# Patient Record
Sex: MALE | Race: WHITE
[De-identification: names, ages, dates, MRNs, and addresses within clinical notes are randomized per-mention and may not be internally consistent; named-entity substitution may affect disease eponyms.]

---

## 2018-10-15 ENCOUNTER — HOSPITAL ENCOUNTER (INPATIENT)
Dept: HOSPITAL 54 - ER | Age: 58
LOS: 6 days | Discharge: NURSING, REHAB OR LONG-TERM CARE | DRG: 720 | End: 2018-10-21
Attending: NURSE PRACTITIONER | Admitting: INTERNAL MEDICINE
Payer: MEDICAID

## 2018-10-15 VITALS — BODY MASS INDEX: 27.77 KG/M2 | HEIGHT: 70 IN | WEIGHT: 194 LBS

## 2018-10-15 VITALS — DIASTOLIC BLOOD PRESSURE: 69 MMHG | SYSTOLIC BLOOD PRESSURE: 158 MMHG

## 2018-10-15 VITALS — DIASTOLIC BLOOD PRESSURE: 55 MMHG | SYSTOLIC BLOOD PRESSURE: 108 MMHG

## 2018-10-15 DIAGNOSIS — R65.20: ICD-10-CM

## 2018-10-15 DIAGNOSIS — E87.2: ICD-10-CM

## 2018-10-15 DIAGNOSIS — S21.201A: ICD-10-CM

## 2018-10-15 DIAGNOSIS — Z59.0: ICD-10-CM

## 2018-10-15 DIAGNOSIS — E83.42: ICD-10-CM

## 2018-10-15 DIAGNOSIS — I21.A1: ICD-10-CM

## 2018-10-15 DIAGNOSIS — D53.9: ICD-10-CM

## 2018-10-15 DIAGNOSIS — E87.6: ICD-10-CM

## 2018-10-15 DIAGNOSIS — R60.9: ICD-10-CM

## 2018-10-15 DIAGNOSIS — I11.0: ICD-10-CM
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
## 2018-10-15 DIAGNOSIS — E88.09: ICD-10-CM

## 2018-10-15 DIAGNOSIS — F10.231: ICD-10-CM

## 2018-10-15 DIAGNOSIS — E66.9: ICD-10-CM

## 2018-10-15 DIAGNOSIS — B35.1: ICD-10-CM

## 2018-10-15 DIAGNOSIS — E87.1: ICD-10-CM

## 2018-10-15 DIAGNOSIS — R73.03: ICD-10-CM

## 2018-10-15 DIAGNOSIS — F17.210: ICD-10-CM

## 2018-10-15 DIAGNOSIS — E83.51: ICD-10-CM

## 2018-10-15 DIAGNOSIS — L03.116: ICD-10-CM

## 2018-10-15 DIAGNOSIS — N17.0: ICD-10-CM

## 2018-10-15 DIAGNOSIS — A41.9: Primary | ICD-10-CM

## 2018-10-15 DIAGNOSIS — E80.6: ICD-10-CM

## 2018-10-15 DIAGNOSIS — I50.33: ICD-10-CM

## 2018-10-15 DIAGNOSIS — L03.115: ICD-10-CM

## 2018-10-15 LAB
ALBUMIN SERPL BCP-MCNC: 3 G/DL (ref 3.4–5)
ALP SERPL-CCNC: 174 U/L (ref 46–116)
ALT SERPL W P-5'-P-CCNC: 41 U/L (ref 12–78)
APTT PPP: 25 SEC (ref 23–34)
AST SERPL W P-5'-P-CCNC: 107 U/L (ref 15–37)
BASOPHILS # BLD AUTO: 0.1 /CMM (ref 0–0.2)
BASOPHILS NFR BLD AUTO: 1 % (ref 0–2)
BILIRUB DIRECT SERPL-MCNC: 0.2 MG/DL (ref 0–0.2)
BILIRUB SERPL-MCNC: 0.5 MG/DL (ref 0.2–1)
BUN SERPL-MCNC: 10 MG/DL (ref 7–18)
CALCIUM SERPL-MCNC: 8.5 MG/DL (ref 8.5–10.1)
CHLORIDE SERPL-SCNC: 101 MMOL/L (ref 98–107)
CO2 SERPL-SCNC: 25 MMOL/L (ref 21–32)
CREAT SERPL-MCNC: 0.9 MG/DL (ref 0.6–1.3)
EOSINOPHIL NFR BLD AUTO: 0.6 % (ref 0–6)
GLUCOSE SERPL-MCNC: 118 MG/DL (ref 74–106)
HCT VFR BLD AUTO: 40 % (ref 39–51)
HGB BLD-MCNC: 13.9 G/DL (ref 13.5–17.5)
INR PPP: 1.06 (ref 0.85–1.15)
LYMPHOCYTES NFR BLD AUTO: 0.5 /CMM (ref 0.8–4.8)
LYMPHOCYTES NFR BLD AUTO: 5 % (ref 20–44)
MCHC RBC AUTO-ENTMCNC: 35 G/DL (ref 31–36)
MCV RBC AUTO: 93 FL (ref 80–96)
MONOCYTES NFR BLD AUTO: 0.8 /CMM (ref 0.1–1.3)
MONOCYTES NFR BLD AUTO: 8.5 % (ref 2–12)
NEUTROPHILS # BLD AUTO: 7.8 /CMM (ref 1.8–8.9)
NEUTROPHILS NFR BLD AUTO: 84.9 % (ref 43–81)
PH UR STRIP: 5 [PH] (ref 5–8)
PLATELET # BLD AUTO: 348 /CMM (ref 150–450)
POTASSIUM SERPL-SCNC: 4.6 MMOL/L (ref 3.5–5.1)
PROT SERPL-MCNC: 7.4 G/DL (ref 6.4–8.2)
RBC # BLD AUTO: 4.29 MIL/UL (ref 4.5–6)
RBC #/AREA URNS HPF: (no result) /HPF (ref 0–2)
RDW COEFFICIENT OF VARIATION: 15.2 (ref 11.5–15)
SODIUM SERPL-SCNC: 139 MMOL/L (ref 136–145)
TROPONIN I SERPL-MCNC: < 0.017 NG/ML (ref 0–0.06)
UROBILINOGEN UR STRIP-MCNC: 0.2 EU/DL
WBC #/AREA URNS HPF: (no result) /HPF (ref 0–3)
WBC NRBC COR # BLD AUTO: 9.3 K/UL (ref 4.3–11)

## 2018-10-15 PROCEDURE — G0378 HOSPITAL OBSERVATION PER HR: HCPCS

## 2018-10-15 PROCEDURE — A4606 OXYGEN PROBE USED W OXIMETER: HCPCS

## 2018-10-15 PROCEDURE — A6403 STERILE GAUZE>16 <= 48 SQ IN: HCPCS

## 2018-10-15 PROCEDURE — A6248 HYDROGEL DRSG GEL FILLER: HCPCS

## 2018-10-15 PROCEDURE — Z7610: HCPCS

## 2018-10-15 PROCEDURE — A4216 STERILE WATER/SALINE, 10 ML: HCPCS

## 2018-10-15 PROCEDURE — A6253 ABSORPT DRG > 48 SQ IN W/O B: HCPCS

## 2018-10-15 PROCEDURE — A6402 STERILE GAUZE <= 16 SQ IN: HCPCS

## 2018-10-15 RX ADMIN — PIPERACILLIN SODIUM AND TAZOBACTAM SODIUM SCH MLS/HR: .375; 3 INJECTION, POWDER, LYOPHILIZED, FOR SOLUTION INTRAVENOUS at 23:49

## 2018-10-15 RX ADMIN — HYDROCODONE BITARTRATE AND ACETAMINOPHEN PRN EA: 10; 325 TABLET ORAL at 23:48

## 2018-10-15 RX ADMIN — LORAZEPAM PRN MG: 2 INJECTION INTRAMUSCULAR; INTRAVENOUS at 23:48

## 2018-10-15 RX ADMIN — ENOXAPARIN SODIUM SCH MG: 40 INJECTION SUBCUTANEOUS at 23:50

## 2018-10-15 SDOH — ECONOMIC STABILITY - HOUSING INSECURITY: HOMELESSNESS: Z59.0

## 2018-10-15 NOTE — NUR
PT  FROM A BUS STOP TO ER BED 7. PT IS C/O BILAT FOOT PAIN, SWELLING FOR 
THE PAST 3 MONTHS, STS WAS SEEN AT 3 DIFFERENT HOSPITAL FOR SAME REASON BUT 
ONLY GETS DISCHARGE. PT IS TACHYCARDIC PTA. DENIES CHEST PAIN. AFEBRILE. 
AWAITING MD MARTINEZ.

## 2018-10-15 NOTE — NUR
RN

MD TUCKER AT BEDSIDE, MD AWARE OF GENERALIZED BODY RASH, MD OK TO CONTINUE IV ATB AS ORDERED, 
MD AWARE OF VS, HR 'S, STAT EKG ORDERED, NO EKG FROM ER AVAILABLE, MD WITH NEW ORDER 
TO START ATIVAN 1MG IVP Q 4HRS PRN . PT DENIES ANY ALLERGIES .

## 2018-10-15 NOTE — NUR
PT IS ASSIGNED TO [facility]#: [id number], DX: SEPTIC SHOCK , AND ACCEPTING MD: DR CEBALLOS

## 2018-10-16 VITALS — DIASTOLIC BLOOD PRESSURE: 78 MMHG | SYSTOLIC BLOOD PRESSURE: 134 MMHG

## 2018-10-16 VITALS — DIASTOLIC BLOOD PRESSURE: 68 MMHG | SYSTOLIC BLOOD PRESSURE: 127 MMHG

## 2018-10-16 VITALS — DIASTOLIC BLOOD PRESSURE: 90 MMHG | SYSTOLIC BLOOD PRESSURE: 142 MMHG

## 2018-10-16 VITALS — DIASTOLIC BLOOD PRESSURE: 82 MMHG | SYSTOLIC BLOOD PRESSURE: 141 MMHG

## 2018-10-16 VITALS — SYSTOLIC BLOOD PRESSURE: 121 MMHG | DIASTOLIC BLOOD PRESSURE: 74 MMHG

## 2018-10-16 VITALS — SYSTOLIC BLOOD PRESSURE: 108 MMHG | DIASTOLIC BLOOD PRESSURE: 55 MMHG

## 2018-10-16 LAB
BASOPHILS # BLD AUTO: 0.1 /CMM (ref 0–0.2)
BASOPHILS NFR BLD AUTO: 0.9 % (ref 0–2)
BUN SERPL-MCNC: 6 MG/DL (ref 7–18)
CALCIUM SERPL-MCNC: 7.7 MG/DL (ref 8.5–10.1)
CHLORIDE SERPL-SCNC: 104 MMOL/L (ref 98–107)
CHOLEST SERPL-MCNC: 127 MG/DL (ref ?–200)
CO2 SERPL-SCNC: 25 MMOL/L (ref 21–32)
CREAT SERPL-MCNC: 0.6 MG/DL (ref 0.6–1.3)
EOSINOPHIL NFR BLD AUTO: 0.8 % (ref 0–6)
GLUCOSE SERPL-MCNC: 136 MG/DL (ref 74–106)
HCT VFR BLD AUTO: 35 % (ref 39–51)
HDLC SERPL-MCNC: 78 MG/DL (ref 40–60)
HGB BLD-MCNC: 11.5 G/DL (ref 13.5–17.5)
LDLC SERPL DIRECT ASSAY-MCNC: 41 MG/DL (ref 0–99)
LYMPHOCYTES NFR BLD AUTO: 0.6 /CMM (ref 0.8–4.8)
LYMPHOCYTES NFR BLD AUTO: 6.3 % (ref 20–44)
MAGNESIUM SERPL-MCNC: 1.5 MG/DL (ref 1.8–2.4)
MCHC RBC AUTO-ENTMCNC: 33 G/DL (ref 31–36)
MCV RBC AUTO: 96 FL (ref 80–96)
MONOCYTES NFR BLD AUTO: 1.3 /CMM (ref 0.1–1.3)
MONOCYTES NFR BLD AUTO: 13.8 % (ref 2–12)
NEUTROPHILS # BLD AUTO: 7.2 /CMM (ref 1.8–8.9)
NEUTROPHILS NFR BLD AUTO: 78.2 % (ref 43–81)
PHOSPHATE SERPL-MCNC: 2.7 MG/DL (ref 2.5–4.9)
PLATELET # BLD AUTO: 256 /CMM (ref 150–450)
POTASSIUM SERPL-SCNC: 3.9 MMOL/L (ref 3.5–5.1)
RBC # BLD AUTO: 3.61 MIL/UL (ref 4.5–6)
RDW COEFFICIENT OF VARIATION: 16.3 (ref 11.5–15)
SODIUM SERPL-SCNC: 140 MMOL/L (ref 136–145)
TRIGL SERPL-MCNC: 19 MG/DL (ref 30–150)
TSH SERPL DL<=0.005 MIU/L-ACNC: 3.44 UIU/ML (ref 0.36–3.74)
WBC NRBC COR # BLD AUTO: 9.2 K/UL (ref 4.3–11)

## 2018-10-16 RX ADMIN — PIPERACILLIN SODIUM AND TAZOBACTAM SODIUM SCH MLS/HR: .375; 3 INJECTION, POWDER, LYOPHILIZED, FOR SOLUTION INTRAVENOUS at 05:17

## 2018-10-16 RX ADMIN — HYDROCODONE BITARTRATE AND ACETAMINOPHEN PRN EA: 10; 325 TABLET ORAL at 06:27

## 2018-10-16 RX ADMIN — DEXTROSE MONOHYDRATE SCH MLS/HR: 50 INJECTION, SOLUTION INTRAVENOUS at 02:25

## 2018-10-16 RX ADMIN — Medication SCH EACH: at 17:15

## 2018-10-16 RX ADMIN — LORAZEPAM PRN MG: 2 INJECTION INTRAMUSCULAR; INTRAVENOUS at 18:11

## 2018-10-16 RX ADMIN — DEXTROSE MONOHYDRATE SCH MLS/HR: 50 INJECTION, SOLUTION INTRAVENOUS at 14:08

## 2018-10-16 RX ADMIN — PIPERACILLIN SODIUM AND TAZOBACTAM SODIUM SCH MLS/HR: .375; 3 INJECTION, POWDER, LYOPHILIZED, FOR SOLUTION INTRAVENOUS at 12:06

## 2018-10-16 RX ADMIN — DEXTROSE MONOHYDRATE SCH MLS/HR: 50 INJECTION, SOLUTION INTRAVENOUS at 05:17

## 2018-10-16 RX ADMIN — ENOXAPARIN SODIUM SCH MG: 40 INJECTION SUBCUTANEOUS at 21:33

## 2018-10-16 RX ADMIN — LORAZEPAM PRN MG: 2 INJECTION INTRAMUSCULAR; INTRAVENOUS at 06:27

## 2018-10-16 RX ADMIN — Medication SCH GM: at 11:56

## 2018-10-16 RX ADMIN — CLOTRIMAZOLE SCH GM: 1 CREAM TOPICAL at 17:15

## 2018-10-16 RX ADMIN — MAGNESIUM SULFATE IN DEXTROSE SCH MLS/HR: 10 INJECTION, SOLUTION INTRAVENOUS at 10:50

## 2018-10-16 RX ADMIN — HYDROCODONE BITARTRATE AND ACETAMINOPHEN PRN EA: 10; 325 TABLET ORAL at 11:18

## 2018-10-16 RX ADMIN — PIPERACILLIN SODIUM AND TAZOBACTAM SODIUM SCH MLS/HR: .375; 3 INJECTION, POWDER, LYOPHILIZED, FOR SOLUTION INTRAVENOUS at 23:39

## 2018-10-16 RX ADMIN — PIPERACILLIN SODIUM AND TAZOBACTAM SODIUM SCH MLS/HR: .375; 3 INJECTION, POWDER, LYOPHILIZED, FOR SOLUTION INTRAVENOUS at 18:17

## 2018-10-16 RX ADMIN — MAGNESIUM SULFATE IN DEXTROSE SCH MLS/HR: 10 INJECTION, SOLUTION INTRAVENOUS at 14:01

## 2018-10-16 RX ADMIN — DEXTROSE MONOHYDRATE SCH MLS/HR: 50 INJECTION, SOLUTION INTRAVENOUS at 21:32

## 2018-10-16 NOTE — NUR
WOUND CARE CONSULT: PT PRESENTS WITH MULTIPLE SKIN ISSUES AND WOUNDS INCLUDING REDNESS TO 
ABDOMEN AND BILATERAL THIGHS, REDNESS WITH MULTIPLE OPEN AREAS TO LOWER LEGS, RT HEEL DRY 
WOUND AND RT BACK WOUND, ALL PRESENT ON ADMISSION. RECOMMEND SURGICAL AND DPM CONSULTS. ALL 
SKIN PROTECTION AND WOUND CARE RECOMMENDATIONS DISCUSSED WITH NURSING STAFF. WILL SEE PRN. 
[facility] MD IN AGREEMENT WITH PLAN OF CARE. CURRENT ONIEL 
SCORE IS 14. 

-------------------------------------------------------------------------------

Addendum: 10/16/18 at 1030 by ALANIS DICKERSON WNDNU

-------------------------------------------------------------------------------

Amended: Links added.
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
## 2018-10-16 NOTE — NUR
SONU/RN NOTES:



RECEIVED PT. IN BED W/ HOB ELEVATED AT ALL TIMES. ON TELE MONITOR W/ ST W/ 122. W/ D5NS @ 
100 ML/HR VIA RIGHT HAND W/ PATENT AND INTACT W/ NO S/S OF INFECTION/INFILTRATION NOTED. W/ 
LFA G 18 PATENT AND INTACT W/ NO S/S OF INFECTION/INFILTRATION NOTED. W/ DRESSING INTACT TO 
BILATERAL LOWER EXTREMITIES. CONTINENT OF B/B. ABLE TO USE URINAL. CALL LIGHT W/ REACH. WILL 
CONTINUE TO MONITOR.

## 2018-10-16 NOTE — NUR
RN NBOTES



PT IN STABLE CONDITION. NO ACUTE CHANGES NOTED THROUGHOUT SHIFT. ALL NEEDS ANTICIPATED. 
ENDORSED TO PM SHIFT RN FOR SAUMYA

## 2018-10-16 NOTE — NUR
RN NOTES



PT C/O ITCHING AFTER VANCO STARTED. HELD IV ATB. NO SOB NOTED. NO C/O PAIN. NOTIFIED NP TEJAL FORREST RE: REACTION. PER NP, OK TO CONTINUE VANCO, JUST RUN IT FOR 2HRS.

## 2018-10-16 NOTE — NUR
SONU/RN INITIAL NOTES



RECEIVED PT IN BED. A/O X4 ON ROOM AIR. NO SOB NOTED. SINUS TACHY ON TELEMONITOR -120. 
WITH ONGOING IVF D5NS @ 100ML/HR INFUSING WELL ON RHAND G18. LFA G18 SL INTACT AND PATENT. 
HOB ELEVATED. SAFETY MEASURES IN PLACED. CALL LIGHT WITHIN REACH. WILL CONT TO MONITOR

## 2018-10-16 NOTE — NUR
RN NOTES



CNA REPORTED SPO2 =90%, NO SOB NOTED. PLACED ON 2L O2 VIA NC, PT TOLERATING WELL. SPO2 WENT 
UP TO 95%

## 2018-10-17 VITALS — SYSTOLIC BLOOD PRESSURE: 134 MMHG | DIASTOLIC BLOOD PRESSURE: 91 MMHG

## 2018-10-17 VITALS — DIASTOLIC BLOOD PRESSURE: 84 MMHG | SYSTOLIC BLOOD PRESSURE: 138 MMHG

## 2018-10-17 VITALS — DIASTOLIC BLOOD PRESSURE: 87 MMHG | SYSTOLIC BLOOD PRESSURE: 145 MMHG

## 2018-10-17 VITALS — DIASTOLIC BLOOD PRESSURE: 95 MMHG | SYSTOLIC BLOOD PRESSURE: 147 MMHG

## 2018-10-17 VITALS — SYSTOLIC BLOOD PRESSURE: 143 MMHG | DIASTOLIC BLOOD PRESSURE: 96 MMHG

## 2018-10-17 VITALS — SYSTOLIC BLOOD PRESSURE: 154 MMHG | DIASTOLIC BLOOD PRESSURE: 109 MMHG

## 2018-10-17 LAB
ALBUMIN SERPL BCP-MCNC: 2.1 G/DL (ref 3.4–5)
ALBUMIN SERPL BCP-MCNC: 2.3 G/DL (ref 3.4–5)
ALP SERPL-CCNC: 117 U/L (ref 46–116)
ALP SERPL-CCNC: 127 U/L (ref 46–116)
ALT SERPL W P-5'-P-CCNC: 24 U/L (ref 12–78)
ALT SERPL W P-5'-P-CCNC: 29 U/L (ref 12–78)
AST SERPL W P-5'-P-CCNC: 65 U/L (ref 15–37)
AST SERPL W P-5'-P-CCNC: 73 U/L (ref 15–37)
BASOPHILS # BLD AUTO: 0 /CMM (ref 0–0.2)
BASOPHILS # BLD AUTO: 0.1 /CMM (ref 0–0.2)
BASOPHILS NFR BLD AUTO: 0.2 % (ref 0–2)
BASOPHILS NFR BLD AUTO: 0.7 % (ref 0–2)
BILIRUB SERPL-MCNC: 1 MG/DL (ref 0.2–1)
BILIRUB SERPL-MCNC: 1.1 MG/DL (ref 0.2–1)
BUN SERPL-MCNC: 6 MG/DL (ref 7–18)
BUN SERPL-MCNC: 8 MG/DL (ref 7–18)
CALCIUM SERPL-MCNC: 7.9 MG/DL (ref 8.5–10.1)
CALCIUM SERPL-MCNC: 8.2 MG/DL (ref 8.5–10.1)
CHLORIDE SERPL-SCNC: 100 MMOL/L (ref 98–107)
CHLORIDE SERPL-SCNC: 99 MMOL/L (ref 98–107)
CO2 SERPL-SCNC: 27 MMOL/L (ref 21–32)
CO2 SERPL-SCNC: 28 MMOL/L (ref 21–32)
CREAT SERPL-MCNC: 0.7 MG/DL (ref 0.6–1.3)
CREAT SERPL-MCNC: 0.9 MG/DL (ref 0.6–1.3)
EOSINOPHIL NFR BLD AUTO: 1.2 % (ref 0–6)
EOSINOPHIL NFR BLD AUTO: 3.5 % (ref 0–6)
GLUCOSE SERPL-MCNC: 117 MG/DL (ref 74–106)
GLUCOSE SERPL-MCNC: 147 MG/DL (ref 74–106)
HCT VFR BLD AUTO: 36 % (ref 39–51)
HCT VFR BLD AUTO: 37 % (ref 39–51)
HGB BLD-MCNC: 11.7 G/DL (ref 13.5–17.5)
HGB BLD-MCNC: 11.9 G/DL (ref 13.5–17.5)
LIPASE SERPL-CCNC: 208 U/L (ref 73–393)
LYMPHOCYTES NFR BLD AUTO: 0.8 /CMM (ref 0.8–4.8)
LYMPHOCYTES NFR BLD AUTO: 0.9 /CMM (ref 0.8–4.8)
LYMPHOCYTES NFR BLD AUTO: 6.2 % (ref 20–44)
LYMPHOCYTES NFR BLD AUTO: 7.1 % (ref 20–44)
MAGNESIUM SERPL-MCNC: 1.8 MG/DL (ref 1.8–2.4)
MCHC RBC AUTO-ENTMCNC: 32 G/DL (ref 31–36)
MCHC RBC AUTO-ENTMCNC: 33 G/DL (ref 31–36)
MCV RBC AUTO: 97 FL (ref 80–96)
MCV RBC AUTO: 97 FL (ref 80–96)
MONOCYTES NFR BLD AUTO: 1.1 /CMM (ref 0.1–1.3)
MONOCYTES NFR BLD AUTO: 1.2 /CMM (ref 0.1–1.3)
MONOCYTES NFR BLD AUTO: 8.9 % (ref 2–12)
MONOCYTES NFR BLD AUTO: 9.7 % (ref 2–12)
NEUTROPHILS # BLD AUTO: 10.3 /CMM (ref 1.8–8.9)
NEUTROPHILS # BLD AUTO: 9.5 /CMM (ref 1.8–8.9)
NEUTROPHILS NFR BLD AUTO: 79 % (ref 43–81)
NEUTROPHILS NFR BLD AUTO: 83.5 % (ref 43–81)
PHOSPHATE SERPL-MCNC: 1.8 MG/DL (ref 2.5–4.9)
PLATELET # BLD AUTO: 189 /CMM (ref 150–450)
PLATELET # BLD AUTO: 231 /CMM (ref 150–450)
POTASSIUM SERPL-SCNC: 3 MMOL/L (ref 3.5–5.1)
POTASSIUM SERPL-SCNC: 3.4 MMOL/L (ref 3.5–5.1)
PROT SERPL-MCNC: 5.8 G/DL (ref 6.4–8.2)
PROT SERPL-MCNC: 6.2 G/DL (ref 6.4–8.2)
RBC # BLD AUTO: 3.69 MIL/UL (ref 4.5–6)
RBC # BLD AUTO: 3.84 MIL/UL (ref 4.5–6)
RDW COEFFICIENT OF VARIATION: 16.2 (ref 11.5–15)
RDW COEFFICIENT OF VARIATION: 16.4 (ref 11.5–15)
SODIUM SERPL-SCNC: 128 MMOL/L (ref 136–145)
SODIUM SERPL-SCNC: 137 MMOL/L (ref 136–145)
WBC NRBC COR # BLD AUTO: 12 K/UL (ref 4.3–11)
WBC NRBC COR # BLD AUTO: 12.3 K/UL (ref 4.3–11)

## 2018-10-17 RX ADMIN — ENOXAPARIN SODIUM SCH MG: 40 INJECTION SUBCUTANEOUS at 20:52

## 2018-10-17 RX ADMIN — PIPERACILLIN SODIUM AND TAZOBACTAM SODIUM SCH MLS/HR: .375; 3 INJECTION, POWDER, LYOPHILIZED, FOR SOLUTION INTRAVENOUS at 12:09

## 2018-10-17 RX ADMIN — Medication PRN MG: at 21:35

## 2018-10-17 RX ADMIN — PIPERACILLIN SODIUM AND TAZOBACTAM SODIUM SCH MLS/HR: .375; 3 INJECTION, POWDER, LYOPHILIZED, FOR SOLUTION INTRAVENOUS at 17:23

## 2018-10-17 RX ADMIN — DEXTROSE MONOHYDRATE SCH MLS/HR: 50 INJECTION, SOLUTION INTRAVENOUS at 00:43

## 2018-10-17 RX ADMIN — Medication SCH EACH: at 17:23

## 2018-10-17 RX ADMIN — POTASSIUM CHLORIDE SCH MEQ: 1500 TABLET, EXTENDED RELEASE ORAL at 13:24

## 2018-10-17 RX ADMIN — DEXTROSE MONOHYDRATE SCH MLS/HR: 50 INJECTION, SOLUTION INTRAVENOUS at 20:52

## 2018-10-17 RX ADMIN — POTASSIUM CHLORIDE SCH MEQ: 1500 TABLET, EXTENDED RELEASE ORAL at 12:03

## 2018-10-17 RX ADMIN — Medication SCH EACH: at 09:04

## 2018-10-17 RX ADMIN — Medication SCH GM: at 10:29

## 2018-10-17 RX ADMIN — CLOTRIMAZOLE SCH GM: 1 CREAM TOPICAL at 10:30

## 2018-10-17 RX ADMIN — DEXTROSE MONOHYDRATE SCH MLS/HR: 50 INJECTION, SOLUTION INTRAVENOUS at 13:24

## 2018-10-17 RX ADMIN — SODIUM CHLORIDE SCH MLS/HR: 9 INJECTION, SOLUTION INTRAVENOUS at 12:22

## 2018-10-17 RX ADMIN — PIPERACILLIN SODIUM AND TAZOBACTAM SODIUM SCH MLS/HR: .375; 3 INJECTION, POWDER, LYOPHILIZED, FOR SOLUTION INTRAVENOUS at 05:49

## 2018-10-17 RX ADMIN — CLOTRIMAZOLE SCH GM: 1 CREAM TOPICAL at 17:23

## 2018-10-17 RX ADMIN — DEXTROSE MONOHYDRATE SCH MLS/HR: 50 INJECTION, SOLUTION INTRAVENOUS at 01:14

## 2018-10-17 RX ADMIN — POTASSIUM CHLORIDE SCH MEQ: 1500 TABLET, EXTENDED RELEASE ORAL at 14:51

## 2018-10-17 RX ADMIN — LORAZEPAM PRN MG: 2 INJECTION INTRAMUSCULAR; INTRAVENOUS at 03:03

## 2018-10-17 RX ADMIN — Medication PRN EACH: at 03:03

## 2018-10-17 RX ADMIN — DEXTROSE MONOHYDRATE SCH MLS/HR: 50 INJECTION, SOLUTION INTRAVENOUS at 04:58

## 2018-10-17 RX ADMIN — LORAZEPAM PRN MG: 2 INJECTION INTRAMUSCULAR; INTRAVENOUS at 16:05

## 2018-10-17 RX ADMIN — HYDROCODONE BITARTRATE AND ACETAMINOPHEN PRN EA: 10; 325 TABLET ORAL at 09:05

## 2018-10-17 RX ADMIN — LORAZEPAM PRN MG: 2 INJECTION INTRAMUSCULAR; INTRAVENOUS at 20:35

## 2018-10-18 VITALS — DIASTOLIC BLOOD PRESSURE: 77 MMHG | SYSTOLIC BLOOD PRESSURE: 127 MMHG

## 2018-10-18 VITALS — SYSTOLIC BLOOD PRESSURE: 119 MMHG | DIASTOLIC BLOOD PRESSURE: 85 MMHG

## 2018-10-18 VITALS — SYSTOLIC BLOOD PRESSURE: 130 MMHG | DIASTOLIC BLOOD PRESSURE: 83 MMHG

## 2018-10-18 VITALS — DIASTOLIC BLOOD PRESSURE: 82 MMHG | SYSTOLIC BLOOD PRESSURE: 116 MMHG

## 2018-10-18 VITALS — SYSTOLIC BLOOD PRESSURE: 158 MMHG | DIASTOLIC BLOOD PRESSURE: 96 MMHG

## 2018-10-18 VITALS — DIASTOLIC BLOOD PRESSURE: 94 MMHG | SYSTOLIC BLOOD PRESSURE: 134 MMHG

## 2018-10-18 VITALS — SYSTOLIC BLOOD PRESSURE: 137 MMHG | DIASTOLIC BLOOD PRESSURE: 82 MMHG

## 2018-10-18 VITALS — DIASTOLIC BLOOD PRESSURE: 76 MMHG | SYSTOLIC BLOOD PRESSURE: 141 MMHG

## 2018-10-18 VITALS — SYSTOLIC BLOOD PRESSURE: 117 MMHG | DIASTOLIC BLOOD PRESSURE: 77 MMHG

## 2018-10-18 VITALS — DIASTOLIC BLOOD PRESSURE: 80 MMHG | SYSTOLIC BLOOD PRESSURE: 124 MMHG

## 2018-10-18 VITALS — DIASTOLIC BLOOD PRESSURE: 78 MMHG | SYSTOLIC BLOOD PRESSURE: 112 MMHG

## 2018-10-18 VITALS — SYSTOLIC BLOOD PRESSURE: 128 MMHG | DIASTOLIC BLOOD PRESSURE: 74 MMHG

## 2018-10-18 VITALS — DIASTOLIC BLOOD PRESSURE: 88 MMHG | SYSTOLIC BLOOD PRESSURE: 131 MMHG

## 2018-10-18 VITALS — DIASTOLIC BLOOD PRESSURE: 90 MMHG | SYSTOLIC BLOOD PRESSURE: 132 MMHG

## 2018-10-18 VITALS — DIASTOLIC BLOOD PRESSURE: 73 MMHG | SYSTOLIC BLOOD PRESSURE: 119 MMHG

## 2018-10-18 VITALS — DIASTOLIC BLOOD PRESSURE: 89 MMHG | SYSTOLIC BLOOD PRESSURE: 131 MMHG

## 2018-10-18 VITALS — SYSTOLIC BLOOD PRESSURE: 133 MMHG | DIASTOLIC BLOOD PRESSURE: 73 MMHG

## 2018-10-18 VITALS — DIASTOLIC BLOOD PRESSURE: 78 MMHG | SYSTOLIC BLOOD PRESSURE: 119 MMHG

## 2018-10-18 VITALS — SYSTOLIC BLOOD PRESSURE: 152 MMHG | DIASTOLIC BLOOD PRESSURE: 84 MMHG

## 2018-10-18 VITALS — SYSTOLIC BLOOD PRESSURE: 126 MMHG | DIASTOLIC BLOOD PRESSURE: 78 MMHG

## 2018-10-18 VITALS — DIASTOLIC BLOOD PRESSURE: 86 MMHG | SYSTOLIC BLOOD PRESSURE: 125 MMHG

## 2018-10-18 VITALS — SYSTOLIC BLOOD PRESSURE: 129 MMHG | DIASTOLIC BLOOD PRESSURE: 80 MMHG

## 2018-10-18 LAB
ALBUMIN SERPL BCP-MCNC: 2.5 G/DL (ref 3.4–5)
ALP SERPL-CCNC: 149 U/L (ref 46–116)
ALT SERPL W P-5'-P-CCNC: 32 U/L (ref 12–78)
AST SERPL W P-5'-P-CCNC: 96 U/L (ref 15–37)
BASOPHILS # BLD AUTO: 0.2 /CMM (ref 0–0.2)
BASOPHILS NFR BLD AUTO: 1.3 % (ref 0–2)
BILIRUB SERPL-MCNC: 1.2 MG/DL (ref 0.2–1)
BUN SERPL-MCNC: 12 MG/DL (ref 7–18)
CALCIUM SERPL-MCNC: 8.5 MG/DL (ref 8.5–10.1)
CHLORIDE SERPL-SCNC: 102 MMOL/L (ref 98–107)
CO2 SERPL-SCNC: 25 MMOL/L (ref 21–32)
CREAT SERPL-MCNC: 1.4 MG/DL (ref 0.6–1.3)
EOSINOPHIL NFR BLD AUTO: 0.3 % (ref 0–6)
GLUCOSE SERPL-MCNC: 77 MG/DL (ref 74–106)
HCT VFR BLD AUTO: 38 % (ref 39–51)
HGB BLD-MCNC: 12.5 G/DL (ref 13.5–17.5)
LYMPHOCYTES NFR BLD AUTO: 0.7 /CMM (ref 0.8–4.8)
LYMPHOCYTES NFR BLD AUTO: 4.8 % (ref 20–44)
MAGNESIUM SERPL-MCNC: 1.8 MG/DL (ref 1.8–2.4)
MCHC RBC AUTO-ENTMCNC: 33 G/DL (ref 31–36)
MCV RBC AUTO: 97 FL (ref 80–96)
MONOCYTES NFR BLD AUTO: 1.9 /CMM (ref 0.1–1.3)
MONOCYTES NFR BLD AUTO: 13.7 % (ref 2–12)
NEUTROPHILS # BLD AUTO: 11.1 /CMM (ref 1.8–8.9)
NEUTROPHILS NFR BLD AUTO: 79.9 % (ref 43–81)
PHOSPHATE SERPL-MCNC: 2.8 MG/DL (ref 2.5–4.9)
PLATELET # BLD AUTO: 236 /CMM (ref 150–450)
POTASSIUM SERPL-SCNC: 4 MMOL/L (ref 3.5–5.1)
PROT SERPL-MCNC: 6.6 G/DL (ref 6.4–8.2)
RBC # BLD AUTO: 3.96 MIL/UL (ref 4.5–6)
RDW COEFFICIENT OF VARIATION: 16.1 (ref 11.5–15)
SODIUM SERPL-SCNC: 141 MMOL/L (ref 136–145)
TROPONIN I SERPL-MCNC: 0.15 NG/ML (ref 0–0.06)
WBC NRBC COR # BLD AUTO: 13.8 K/UL (ref 4.3–11)

## 2018-10-18 RX ADMIN — LORAZEPAM PRN MG: 2 INJECTION INTRAMUSCULAR; INTRAVENOUS at 17:17

## 2018-10-18 RX ADMIN — ALBUTEROL SULFATE SCH MG: 2.5 SOLUTION RESPIRATORY (INHALATION) at 19:35

## 2018-10-18 RX ADMIN — PIPERACILLIN SODIUM AND TAZOBACTAM SODIUM SCH MLS/HR: .375; 3 INJECTION, POWDER, LYOPHILIZED, FOR SOLUTION INTRAVENOUS at 17:17

## 2018-10-18 RX ADMIN — PIPERACILLIN SODIUM AND TAZOBACTAM SODIUM SCH MLS/HR: .375; 3 INJECTION, POWDER, LYOPHILIZED, FOR SOLUTION INTRAVENOUS at 06:04

## 2018-10-18 RX ADMIN — Medication SCH EACH: at 08:27

## 2018-10-18 RX ADMIN — DEXTROSE MONOHYDRATE SCH MLS/HR: 50 INJECTION, SOLUTION INTRAVENOUS at 01:27

## 2018-10-18 RX ADMIN — ENOXAPARIN SODIUM SCH MG: 40 INJECTION SUBCUTANEOUS at 21:12

## 2018-10-18 RX ADMIN — SODIUM CHLORIDE SCH MLS/HR: 9 INJECTION, SOLUTION INTRAVENOUS at 18:00

## 2018-10-18 RX ADMIN — DEXTROSE MONOHYDRATE SCH MLS/HR: 50 INJECTION, SOLUTION INTRAVENOUS at 13:24

## 2018-10-18 RX ADMIN — Medication PRN MG: at 11:28

## 2018-10-18 RX ADMIN — Medication PRN MG: at 03:58

## 2018-10-18 RX ADMIN — CLOTRIMAZOLE SCH APPLIC: 1 CREAM TOPICAL at 17:17

## 2018-10-18 RX ADMIN — DEXTROSE MONOHYDRATE SCH MLS/HR: 50 INJECTION, SOLUTION INTRAVENOUS at 22:00

## 2018-10-18 RX ADMIN — SODIUM CHLORIDE SCH MLS/HR: 9 INJECTION, SOLUTION INTRAVENOUS at 02:12

## 2018-10-18 RX ADMIN — ALBUTEROL SULFATE SCH MG: 2.5 SOLUTION RESPIRATORY (INHALATION) at 15:30

## 2018-10-18 RX ADMIN — ALBUTEROL SULFATE SCH MG: 2.5 SOLUTION RESPIRATORY (INHALATION) at 11:24

## 2018-10-18 RX ADMIN — DEXTROSE MONOHYDRATE SCH MLS/HR: 50 INJECTION, SOLUTION INTRAVENOUS at 01:26

## 2018-10-18 RX ADMIN — LORAZEPAM PRN MG: 2 INJECTION INTRAMUSCULAR; INTRAVENOUS at 14:22

## 2018-10-18 RX ADMIN — PIPERACILLIN SODIUM AND TAZOBACTAM SODIUM SCH MLS/HR: .375; 3 INJECTION, POWDER, LYOPHILIZED, FOR SOLUTION INTRAVENOUS at 01:03

## 2018-10-18 RX ADMIN — PIPERACILLIN SODIUM AND TAZOBACTAM SODIUM SCH MLS/HR: .375; 3 INJECTION, POWDER, LYOPHILIZED, FOR SOLUTION INTRAVENOUS at 11:32

## 2018-10-18 RX ADMIN — LORAZEPAM PRN MG: 2 INJECTION INTRAMUSCULAR; INTRAVENOUS at 13:03

## 2018-10-18 RX ADMIN — CLOTRIMAZOLE SCH APPLIC: 1 CREAM TOPICAL at 08:26

## 2018-10-18 RX ADMIN — LORAZEPAM PRN MG: 2 INJECTION INTRAMUSCULAR; INTRAVENOUS at 09:19

## 2018-10-18 RX ADMIN — ALBUTEROL SULFATE SCH MG: 2.5 SOLUTION RESPIRATORY (INHALATION) at 23:12

## 2018-10-18 RX ADMIN — SODIUM CHLORIDE SCH MLS/HR: 9 INJECTION, SOLUTION INTRAVENOUS at 13:08

## 2018-10-18 RX ADMIN — MEROPENEM SCH MLS/HR: 500 INJECTION INTRAVENOUS at 21:11

## 2018-10-18 RX ADMIN — LORAZEPAM PRN MG: 2 INJECTION INTRAMUSCULAR; INTRAVENOUS at 01:01

## 2018-10-18 RX ADMIN — Medication SCH EACH: at 17:17

## 2018-10-18 RX ADMIN — Medication SCH APPLIC: at 08:26

## 2018-10-18 RX ADMIN — DEXTROSE MONOHYDRATE SCH MLS/HR: 50 INJECTION, SOLUTION INTRAVENOUS at 04:00

## 2018-10-18 NOTE — NUR
TELE RN NOTES

SEEN BY SEBASTIAN KELLER UPDATED ABOUT PATIENT CONDITION WITH LABS.WILL LET  KNOW ABOUT 
PATIENT CONDITION.

## 2018-10-18 NOTE — NUR
icu rn. initial assessment. received the pt rest on the bed. LETHARGIC, PT IS SLEEPING. 
OXYGEN 4L VIA NASAL CANNULA. SAT 98%. NO ACUTE DISTRESS NOTED. CARDIAC MONITOR SHOWING NSR, 
IV RT AND LT HAND 18G. IVF NS 100ML/H, HOB ELEVATED. AFEBRILE.MONICA SOFT WRIST RESTRAINT 
CHECKED AND RELEASED. NO INJURY OR REDNESS NOTED. WILL CONTINUE TO MONITOR VITALS.

## 2018-10-18 NOTE — NUR
TELE RN NOTES

PATIENT TRANSFERRED TO ICU WITH ACLS PROTOCOL.ALL BELONGINGS AND MEDICATION HANDED OVER TO 
THE NURSE MANN.

## 2018-10-18 NOTE — NUR
received pt from Tele, s/p alcohol withdrawal, tremors present, pt alert, follows commands, 
confused at times, SR, ST, on  4 L 02 sat well, Ativan 2mg ivp given in Tele, BLLE 
cellulites and wounds, 2 iv lines started, restraints on,  v/s stable, no pain, pt turned 
and repositioned.

## 2018-10-18 NOTE — NUR
TELE RN NOTES



AWAKE & RESPONSIVE. STILL CONFUSED AT TIMES. NOT IN ANY DISTRESS. NO SOB NOTED. DENIES ANY 
PAIN OR DISCOMFORT AT THIS TIME. ON TELE ST @ 115 WITH IVF INFUSING WELL. AM CARE DONE. 
MONITORED ACCORDINGLY. CALL LIGHT WITHIN REACH. BED IN LOWEST POSITION. SR UP X 3 FOR 
SAFETY. WILL ENDORSE TO NEXT SHIFT.

## 2018-10-18 NOTE — NUR
TELE RN NOTES

 SEEN BY  UPDATED ABOUT PATIENT CONDITION.DIAPHORETIC WITH TREMORS,DENIED CHEST 
PAIN.GOT ORDER FOR STAT EKG AND LORAZEPAM 2MGX1 AND TRANSFER TO ICU.CARRIED OUT ORDERS.CALL 
MADE TO ICU NURSE,REPORT GIVEN TO MACKENZIE.MADE AWARE DRESSING CHANGE DONE AND ABOUT PATIENT 
CONDITION.WILL TRANSFER PATIENT TO ICU.

## 2018-10-18 NOTE — NUR
PT ASLEEP AND IN NO RESP DISTRESS NOTED. WILL CONT TO MONITOR

-------------------------------------------------------------------------------

Addendum: 10/18/18 at 1648 by ALFREDO COPELAND RT

-------------------------------------------------------------------------------

Amended: Links added.

## 2018-10-18 NOTE — NUR
ICU RN. 2200 VANCOMYCIN  NOT GIVEN. VANCO LEVEL IS 34.  I CALLED [facility]ARM[facility].  MADE 
AWARE, SHE SAID  JUST  HOLD TONIGHT AND TOMORROW WILL DO VANCOMYCIN LEVEL.

## 2018-10-18 NOTE — NUR
TELE RN NOTES

PATIENT IS AGITATED PRN ATIVAN GIVEN, WITH TREMORS,DIAPHORETIC ,AXOX4 BUT TALKING TO HIMSELF 
WITH PERIODS OF CONFUSION.VITAL SIGNS STABLE.O2 SAT 96%.WHEEZING PRESENT.NO SOB NO 
DISTRESS NOTED. AWARE.GOT NEW ORDER FOR STAT C XRAY AND BREATHING TREATMENT .WILL 
CONTINUE TO MONITOR.

## 2018-10-18 NOTE — NUR
TELE RN OPENING NOTES:



RECEIVED PT. IN BED W/ HOB ELEVATED . ON TELE MONITOR W/ ST W/ 112. IV LFA WITH NS @ 100 
ML/HR  PATENT AND INTACT W/ NO S/S OF INFECTION/INFILTRATION NOTED.RESTLESS.NO SOB NO 
DISTRESS NOTED.DENIED PAIN.AXOX4 WITH PERIODS OF CONFUSION.TALKING TO HIMSELF.BUE SOFT 
RESTRAINS ON .ABDOMEN DISTENDED.SRX3,CALL LIGHT IN REACH.BED IS LOW AND IN LOCKED 
POSITION.WILL CONTINUE TO MONITOR.

## 2018-10-18 NOTE — NUR
pt is resting in the bed, SR, alert, follows commands, occasional tremors present, ativan 
ivp 2mg given, v/s stable, no pain, pt cleaned, changed and repositioned q2hrs.

## 2018-10-19 VITALS — SYSTOLIC BLOOD PRESSURE: 143 MMHG | DIASTOLIC BLOOD PRESSURE: 98 MMHG

## 2018-10-19 VITALS — DIASTOLIC BLOOD PRESSURE: 85 MMHG | SYSTOLIC BLOOD PRESSURE: 134 MMHG

## 2018-10-19 VITALS — SYSTOLIC BLOOD PRESSURE: 142 MMHG | DIASTOLIC BLOOD PRESSURE: 97 MMHG

## 2018-10-19 VITALS — DIASTOLIC BLOOD PRESSURE: 87 MMHG | SYSTOLIC BLOOD PRESSURE: 153 MMHG

## 2018-10-19 VITALS — DIASTOLIC BLOOD PRESSURE: 92 MMHG | SYSTOLIC BLOOD PRESSURE: 141 MMHG

## 2018-10-19 VITALS — DIASTOLIC BLOOD PRESSURE: 78 MMHG | SYSTOLIC BLOOD PRESSURE: 126 MMHG

## 2018-10-19 VITALS — DIASTOLIC BLOOD PRESSURE: 93 MMHG | SYSTOLIC BLOOD PRESSURE: 156 MMHG

## 2018-10-19 VITALS — SYSTOLIC BLOOD PRESSURE: 133 MMHG | DIASTOLIC BLOOD PRESSURE: 95 MMHG

## 2018-10-19 VITALS — DIASTOLIC BLOOD PRESSURE: 99 MMHG | SYSTOLIC BLOOD PRESSURE: 162 MMHG

## 2018-10-19 VITALS — SYSTOLIC BLOOD PRESSURE: 127 MMHG | DIASTOLIC BLOOD PRESSURE: 80 MMHG

## 2018-10-19 VITALS — SYSTOLIC BLOOD PRESSURE: 148 MMHG | DIASTOLIC BLOOD PRESSURE: 86 MMHG

## 2018-10-19 VITALS — SYSTOLIC BLOOD PRESSURE: 144 MMHG | DIASTOLIC BLOOD PRESSURE: 85 MMHG

## 2018-10-19 VITALS — SYSTOLIC BLOOD PRESSURE: 141 MMHG | DIASTOLIC BLOOD PRESSURE: 109 MMHG

## 2018-10-19 VITALS — DIASTOLIC BLOOD PRESSURE: 86 MMHG | SYSTOLIC BLOOD PRESSURE: 131 MMHG

## 2018-10-19 VITALS — DIASTOLIC BLOOD PRESSURE: 92 MMHG | SYSTOLIC BLOOD PRESSURE: 142 MMHG

## 2018-10-19 VITALS — DIASTOLIC BLOOD PRESSURE: 90 MMHG | SYSTOLIC BLOOD PRESSURE: 138 MMHG

## 2018-10-19 VITALS — SYSTOLIC BLOOD PRESSURE: 113 MMHG | DIASTOLIC BLOOD PRESSURE: 80 MMHG

## 2018-10-19 VITALS — SYSTOLIC BLOOD PRESSURE: 131 MMHG | DIASTOLIC BLOOD PRESSURE: 82 MMHG

## 2018-10-19 VITALS — SYSTOLIC BLOOD PRESSURE: 139 MMHG | DIASTOLIC BLOOD PRESSURE: 76 MMHG

## 2018-10-19 VITALS — DIASTOLIC BLOOD PRESSURE: 93 MMHG | SYSTOLIC BLOOD PRESSURE: 138 MMHG

## 2018-10-19 VITALS — SYSTOLIC BLOOD PRESSURE: 121 MMHG | DIASTOLIC BLOOD PRESSURE: 73 MMHG

## 2018-10-19 VITALS — DIASTOLIC BLOOD PRESSURE: 98 MMHG | SYSTOLIC BLOOD PRESSURE: 142 MMHG

## 2018-10-19 VITALS — SYSTOLIC BLOOD PRESSURE: 136 MMHG | DIASTOLIC BLOOD PRESSURE: 90 MMHG

## 2018-10-19 VITALS — SYSTOLIC BLOOD PRESSURE: 134 MMHG | DIASTOLIC BLOOD PRESSURE: 84 MMHG

## 2018-10-19 VITALS — DIASTOLIC BLOOD PRESSURE: 77 MMHG | SYSTOLIC BLOOD PRESSURE: 145 MMHG

## 2018-10-19 VITALS — SYSTOLIC BLOOD PRESSURE: 134 MMHG | DIASTOLIC BLOOD PRESSURE: 87 MMHG

## 2018-10-19 VITALS — DIASTOLIC BLOOD PRESSURE: 88 MMHG | SYSTOLIC BLOOD PRESSURE: 144 MMHG

## 2018-10-19 VITALS — SYSTOLIC BLOOD PRESSURE: 150 MMHG | DIASTOLIC BLOOD PRESSURE: 93 MMHG

## 2018-10-19 VITALS — SYSTOLIC BLOOD PRESSURE: 140 MMHG | DIASTOLIC BLOOD PRESSURE: 83 MMHG

## 2018-10-19 VITALS — DIASTOLIC BLOOD PRESSURE: 99 MMHG | SYSTOLIC BLOOD PRESSURE: 154 MMHG

## 2018-10-19 VITALS — DIASTOLIC BLOOD PRESSURE: 90 MMHG | SYSTOLIC BLOOD PRESSURE: 142 MMHG

## 2018-10-19 VITALS — SYSTOLIC BLOOD PRESSURE: 123 MMHG | DIASTOLIC BLOOD PRESSURE: 83 MMHG

## 2018-10-19 VITALS — SYSTOLIC BLOOD PRESSURE: 135 MMHG | DIASTOLIC BLOOD PRESSURE: 94 MMHG

## 2018-10-19 VITALS — DIASTOLIC BLOOD PRESSURE: 71 MMHG | SYSTOLIC BLOOD PRESSURE: 129 MMHG

## 2018-10-19 VITALS — DIASTOLIC BLOOD PRESSURE: 83 MMHG | SYSTOLIC BLOOD PRESSURE: 128 MMHG

## 2018-10-19 VITALS — DIASTOLIC BLOOD PRESSURE: 99 MMHG | SYSTOLIC BLOOD PRESSURE: 144 MMHG

## 2018-10-19 LAB
BUN SERPL-MCNC: 22 MG/DL (ref 7–18)
CALCIUM SERPL-MCNC: 7.4 MG/DL (ref 8.5–10.1)
CHLORIDE SERPL-SCNC: 108 MMOL/L (ref 98–107)
CO2 SERPL-SCNC: 24 MMOL/L (ref 21–32)
CREAT SERPL-MCNC: 2.1 MG/DL (ref 0.6–1.3)
GLUCOSE SERPL-MCNC: 78 MG/DL (ref 74–106)
POTASSIUM SERPL-SCNC: 3.3 MMOL/L (ref 3.5–5.1)
SODIUM SERPL-SCNC: 146 MMOL/L (ref 136–145)

## 2018-10-19 RX ADMIN — DEXTROSE MONOHYDRATE SCH MLS/HR: 50 INJECTION, SOLUTION INTRAVENOUS at 09:20

## 2018-10-19 RX ADMIN — MEROPENEM SCH MLS/HR: 500 INJECTION INTRAVENOUS at 04:28

## 2018-10-19 RX ADMIN — ALBUTEROL SULFATE SCH MG: 2.5 SOLUTION RESPIRATORY (INHALATION) at 11:23

## 2018-10-19 RX ADMIN — LEVOFLOXACIN SCH MG: 250 TABLET, FILM COATED ORAL at 20:53

## 2018-10-19 RX ADMIN — CLOTRIMAZOLE SCH APPLIC: 1 CREAM TOPICAL at 17:20

## 2018-10-19 RX ADMIN — ENOXAPARIN SODIUM SCH MG: 40 INJECTION SUBCUTANEOUS at 20:54

## 2018-10-19 RX ADMIN — ALBUTEROL SULFATE SCH MG: 2.5 SOLUTION RESPIRATORY (INHALATION) at 23:02

## 2018-10-19 RX ADMIN — DEXTROSE MONOHYDRATE SCH MLS/HR: 50 INJECTION, SOLUTION INTRAVENOUS at 20:54

## 2018-10-19 RX ADMIN — Medication SCH EACH: at 17:19

## 2018-10-19 RX ADMIN — MEROPENEM SCH MLS/HR: 500 INJECTION INTRAVENOUS at 12:46

## 2018-10-19 RX ADMIN — ALBUTEROL SULFATE SCH MG: 2.5 SOLUTION RESPIRATORY (INHALATION) at 07:35

## 2018-10-19 RX ADMIN — SILVER SULFADIAZINE SCH GM: 10 CREAM TOPICAL at 20:53

## 2018-10-19 RX ADMIN — ALBUTEROL SULFATE SCH MG: 2.5 SOLUTION RESPIRATORY (INHALATION) at 15:17

## 2018-10-19 RX ADMIN — Medication SCH APPLIC: at 08:47

## 2018-10-19 RX ADMIN — Medication SCH EACH: at 08:46

## 2018-10-19 RX ADMIN — FOLIC ACID SCH MG: 1 TABLET ORAL at 08:46

## 2018-10-19 RX ADMIN — SODIUM CHLORIDE SCH MLS/HR: 9 INJECTION, SOLUTION INTRAVENOUS at 04:29

## 2018-10-19 RX ADMIN — ALBUTEROL SULFATE SCH MG: 2.5 SOLUTION RESPIRATORY (INHALATION) at 03:31

## 2018-10-19 RX ADMIN — CLOTRIMAZOLE SCH APPLIC: 1 CREAM TOPICAL at 08:46

## 2018-10-19 RX ADMIN — Medication SCH MG: at 08:46

## 2018-10-19 RX ADMIN — LORAZEPAM PRN MG: 2 INJECTION INTRAMUSCULAR; INTRAVENOUS at 02:20

## 2018-10-19 RX ADMIN — ALBUTEROL SULFATE SCH MG: 2.5 SOLUTION RESPIRATORY (INHALATION) at 19:36

## 2018-10-19 NOTE — NUR
ICU RN. INITIAL ASSESSMENT  RECEIVED THE PT REST ON THE BED. AWAKE, ALERT, FOLLOW COMMANDS. 
CARDIAC MONITOR SHOWING NSR. IV RT HAND 18G. IVF D5NS 50 ML/H, OXYGEN 4L VIA NASAL CANNULA. 
SAT 98%, NO ACUTE DISTRESS NOTED. FC PATENT. WILL CONTINUE TO MONITOR VITALS.

## 2018-10-19 NOTE — NUR
pt is resting in the bed, alert, follows commands, v/s stable, no pain, pt turned and 
repositioned q2hrs.

## 2018-10-19 NOTE — NUR
PT REFUSED RESP TX AT THIS TIME. NO S/S OF SOB NOTED. WILL CONT TO MONITOR. SPO2 98% 

-------------------------------------------------------------------------------

Addendum: 10/19/18 at 0820 by ALFREDO COPELAND RT

-------------------------------------------------------------------------------

Amended: Links added.

## 2018-10-19 NOTE — NUR
received pt from night shift, alert, follows commands, SR, on 4L 02, lungs partially 
congested, BLLE edema, tolerates diet, 

f/c good urine output, v/s stable, no pain, pt turned and repositioned.

## 2018-10-19 NOTE — NUR
pt is resting in the bed, alert, follows commands, v/s stable, no pain, pt cleaned, changed 
and repositioned q2hrs.

## 2018-10-19 NOTE — NUR
ICU RN. AM CARE, ORAL CARE, BED BATH GIVEN. LINEN CHANGED. REMAINING SAME OXYGEN TOLERATED 
WELL. SAT 98%. NO ACUTE DISTRESS NOTED. CARDIAC MONITOR SHOWING NSR. IV RT AND LT HAND. IVF 
NS 100ML/H. HOB ELEVATED. MONICA SOFT WRIST RESTRAINT CHECKED AND RELEASED, NO INJURY OR 
REDNESS NOTED. FC PATENT. URINE DRAINING. WILL CONTINUE TO MONITOR VITALS.

## 2018-10-19 NOTE — NUR
RN NOTES





RECEIVED PATIENT'S TELEPHONE REPORT FROM ICU RN ROSA ISELA. RECEIVED PATIENT FROM ICU, A/OX4, ON 
4L O2 VIA NC WITH SPO2 OF 97%, B/P- 138/85, HR-98, RR-18, T-98.0. PATIENT PLACED ON CARDIAC 
MONITOR WITH SR/ST.PATIENT COMPLAINT OF PAIN 8/10 ON PAIN SCALE. WADSWORTH CATH. IS NOTED INTACT 
AND DRAINING YELLOW, CLEAR URIN ON GRAVITY.RIGHT WRIST 18G AND LEFT FOREARM G18 IV LINES ARE 
PATIENT, INTACT. PATIENT HAS TREMORS AND ANXIOUS AND ASKED FOR MEDICATION.ALL SAFETY 
MEASURES ARE IMPLEMENTED, BED IN LOW, LOCKED POSITION, CALL LIGHT IN REACH. WILL CONT. TO 
MONITOR. 

-------------------------------------------------------------------------------

Addendum: 10/20/18 at 0657 by VALERIA PARKS RN

-------------------------------------------------------------------------------

PLEASE DISREGARD THIS NOTES. WRONG TIME DOCUMENTATION.

## 2018-10-20 VITALS — DIASTOLIC BLOOD PRESSURE: 87 MMHG | SYSTOLIC BLOOD PRESSURE: 137 MMHG

## 2018-10-20 VITALS — DIASTOLIC BLOOD PRESSURE: 93 MMHG | SYSTOLIC BLOOD PRESSURE: 142 MMHG

## 2018-10-20 VITALS — DIASTOLIC BLOOD PRESSURE: 96 MMHG | SYSTOLIC BLOOD PRESSURE: 147 MMHG

## 2018-10-20 VITALS — SYSTOLIC BLOOD PRESSURE: 138 MMHG | DIASTOLIC BLOOD PRESSURE: 85 MMHG

## 2018-10-20 VITALS — DIASTOLIC BLOOD PRESSURE: 87 MMHG | SYSTOLIC BLOOD PRESSURE: 134 MMHG

## 2018-10-20 VITALS — SYSTOLIC BLOOD PRESSURE: 144 MMHG | DIASTOLIC BLOOD PRESSURE: 86 MMHG

## 2018-10-20 LAB
BASOPHILS # BLD AUTO: 0.1 /CMM (ref 0–0.2)
BASOPHILS NFR BLD AUTO: 1.3 % (ref 0–2)
BUN SERPL-MCNC: 24 MG/DL (ref 7–18)
CALCIUM SERPL-MCNC: 7.3 MG/DL (ref 8.5–10.1)
CHLORIDE SERPL-SCNC: 109 MMOL/L (ref 98–107)
CO2 SERPL-SCNC: 26 MMOL/L (ref 21–32)
CREAT SERPL-MCNC: 2.1 MG/DL (ref 0.6–1.3)
EOSINOPHIL NFR BLD AUTO: 8.4 % (ref 0–6)
EOSINOPHIL NFR BLD MANUAL: 5 % (ref 0–4)
GLUCOSE SERPL-MCNC: 96 MG/DL (ref 74–106)
HCT VFR BLD AUTO: 36 % (ref 39–51)
HGB BLD-MCNC: 11.9 G/DL (ref 13.5–17.5)
LYMPHOCYTES NFR BLD AUTO: 0.9 /CMM (ref 0.8–4.8)
LYMPHOCYTES NFR BLD AUTO: 9.9 % (ref 20–44)
LYMPHOCYTES NFR BLD MANUAL: 9 % (ref 16–48)
MAGNESIUM SERPL-MCNC: 1.9 MG/DL (ref 1.8–2.4)
MCHC RBC AUTO-ENTMCNC: 33 G/DL (ref 31–36)
MCV RBC AUTO: 97 FL (ref 80–96)
MONOCYTES NFR BLD AUTO: 1.5 /CMM (ref 0.1–1.3)
MONOCYTES NFR BLD AUTO: 16.3 % (ref 2–12)
MONOCYTES NFR BLD MANUAL: 16 % (ref 0–11)
NEUTROPHILS # BLD AUTO: 5.8 /CMM (ref 1.8–8.9)
NEUTROPHILS NFR BLD AUTO: 64.1 % (ref 43–81)
NEUTS SEG NFR BLD MANUAL: 70 % (ref 42–76)
PHOSPHATE SERPL-MCNC: 4.2 MG/DL (ref 2.5–4.9)
PLATELET # BLD AUTO: 215 /CMM (ref 150–450)
POTASSIUM SERPL-SCNC: 3.4 MMOL/L (ref 3.5–5.1)
RBC # BLD AUTO: 3.69 MIL/UL (ref 4.5–6)
SODIUM SERPL-SCNC: 146 MMOL/L (ref 136–145)
WBC NRBC COR # BLD AUTO: 9.1 K/UL (ref 4.3–11)

## 2018-10-20 RX ADMIN — FOLIC ACID SCH MG: 1 TABLET ORAL at 09:10

## 2018-10-20 RX ADMIN — ALBUTEROL SULFATE SCH MG: 2.5 SOLUTION RESPIRATORY (INHALATION) at 19:30

## 2018-10-20 RX ADMIN — HYDROCODONE BITARTRATE AND ACETAMINOPHEN PRN EA: 10; 325 TABLET ORAL at 00:11

## 2018-10-20 RX ADMIN — CLOTRIMAZOLE SCH APPLIC: 1 CREAM TOPICAL at 09:12

## 2018-10-20 RX ADMIN — LEVOFLOXACIN SCH MG: 250 TABLET, FILM COATED ORAL at 19:38

## 2018-10-20 RX ADMIN — LORAZEPAM PRN MG: 2 INJECTION INTRAMUSCULAR; INTRAVENOUS at 00:42

## 2018-10-20 RX ADMIN — HYDROCODONE BITARTRATE AND ACETAMINOPHEN PRN EA: 10; 325 TABLET ORAL at 16:07

## 2018-10-20 RX ADMIN — ALBUTEROL SULFATE SCH MG: 2.5 SOLUTION RESPIRATORY (INHALATION) at 15:30

## 2018-10-20 RX ADMIN — DEXTROSE MONOHYDRATE SCH MLS/HR: 50 INJECTION, SOLUTION INTRAVENOUS at 21:47

## 2018-10-20 RX ADMIN — Medication SCH EACH: at 09:10

## 2018-10-20 RX ADMIN — CLOTRIMAZOLE SCH APPLIC: 1 CREAM TOPICAL at 16:08

## 2018-10-20 RX ADMIN — ALBUTEROL SULFATE SCH MG: 2.5 SOLUTION RESPIRATORY (INHALATION) at 11:39

## 2018-10-20 RX ADMIN — DEXTROSE MONOHYDRATE SCH MLS/HR: 50 INJECTION, SOLUTION INTRAVENOUS at 09:10

## 2018-10-20 RX ADMIN — ENOXAPARIN SODIUM SCH MG: 40 INJECTION SUBCUTANEOUS at 21:48

## 2018-10-20 RX ADMIN — ALBUTEROL SULFATE SCH MG: 2.5 SOLUTION RESPIRATORY (INHALATION) at 03:28

## 2018-10-20 RX ADMIN — SILVER SULFADIAZINE SCH GM: 10 CREAM TOPICAL at 09:12

## 2018-10-20 RX ADMIN — HYDROCODONE BITARTRATE AND ACETAMINOPHEN PRN EA: 10; 325 TABLET ORAL at 23:39

## 2018-10-20 RX ADMIN — Medication SCH MG: at 16:06

## 2018-10-20 RX ADMIN — Medication SCH EACH: at 16:06

## 2018-10-20 RX ADMIN — Medication SCH APPLIC: at 09:11

## 2018-10-20 RX ADMIN — ALBUTEROL SULFATE SCH MG: 2.5 SOLUTION RESPIRATORY (INHALATION) at 07:22

## 2018-10-20 RX ADMIN — SILVER SULFADIAZINE SCH GM: 10 CREAM TOPICAL at 16:08

## 2018-10-20 RX ADMIN — ALBUTEROL SULFATE SCH MG: 2.5 SOLUTION RESPIRATORY (INHALATION) at 23:49

## 2018-10-20 RX ADMIN — Medication SCH MG: at 09:10

## 2018-10-20 RX ADMIN — Medication PRN EACH: at 19:44

## 2018-10-20 NOTE — NUR
RN NOTES





RECEIVED PATIENT'S TELEPHONE REPORT FROM ICU RN ROSA ISELA. RECEIVED PATIENT FROM ICU, A/OX4, ON 
4L O2 VIA NC WITH SPO2 OF 97%, B/P- 138/85, HR-98, RR-18, T-98.0. PATIENT PLACED ON CARDIAC 
MONITOR WITH SR/ST.PATIENT COMPLAINT OF PAIN 8/10 ON PAIN SCALE. WADSWORTH CATH. IS NOTED INTACT 
AND DRAINING YELLOW, CLEAR URIN ON GRAVITY.RIGHT WRIST 18G AND LEFT FOREARM G18 IV LINES ARE 
PATIENT, INTACT. PATIENT HAS TREMORS AND ANXIOUS AND ASKED FOR MEDICATION.ALL SAFETY 
MEASURES ARE IMPLEMENTED, BED IN LOW, LOCKED POSITION, CALL LIGHT IN REACH. WILL CONT. TO 
MONITOR.

## 2018-10-20 NOTE — NUR
RN CLOSING NOTES





PATIENT IS A/OX4,STABLE, ON 4L O2 VIA NC WITH SPO2 OF >96% PATIENT SR ON CARDIAC MONITOR.NO 
COMPLAINT OF PAIN  AT THIS TIME. WADSWORTH CATH. IS INTACT AND DRAINING YELLOW, CLEAR URIN ON 
GRAVITY.RIGHT WRIST 18G AND LEFT FOREARM G18 IV LINES ARE PATIENT, INTACT. WOUND CARE 
PROVIDED AS ORDERED, PATIENT TURNED AND REPOSITIONED Q2HR, ALL SAFETY MEASURES ARE 
IMPLEMENTED, BED IN LOW, LOCKED POSITION, CALL LIGHT IN REACH. WILL GIVE PATIENT  CARE 
REPORT  TO AM SHIFT FOR CPC.

## 2018-10-20 NOTE — NUR
RN NOTES



PT IN STABLE CONDITION. NO ACUTE CHANGES NOTED THROUGHOUT SHIFT. SAFETY MEASURES OBSERVED AT 
ALL TIMES. ALL NEEDS ANTICIPATED. ENDORSED TO PM SHIFT RN FOR SAUMYA

## 2018-10-20 NOTE — NUR
RN NOTES

PATIENT IS ALERT AND ORIENTED X4, NO SOB, CALM, NO DISTRESS, COMPLAINING OF PAIN TO BLE 
EXTREMITIES DUE TO CELLULITIS, ON BEDREST, STRICT I&0, WADSWORTH CATHETER DRAINING WELL, BLE 
WOUND DRESSING ARE DRY AND CLEAN, CALL LIGHT WITHIN REACH.

## 2018-10-20 NOTE — NUR
TELE/RN INITIAL NOTES



RECEIVED PT IN BED, A/OX4. NO C/O PAIN AT THIS TIME. ON 4L O2 VIA NC TOLERATING WELL, NO SOB 
NOTED. SR ON TELEMONITOR. RWRIST G18 AND LFA G18 SL INTACT AND PATENT. F/C INTACT AND IN 
PLACED, DRAINING CLEAR YELLOW URINE. SAFETY MEASURES IN PLACED. CALL LIGHT WITHIN REACH. 
WILL CONT TO MONITOR

## 2018-10-21 VITALS — DIASTOLIC BLOOD PRESSURE: 90 MMHG | SYSTOLIC BLOOD PRESSURE: 139 MMHG

## 2018-10-21 VITALS — SYSTOLIC BLOOD PRESSURE: 138 MMHG | DIASTOLIC BLOOD PRESSURE: 95 MMHG

## 2018-10-21 VITALS — SYSTOLIC BLOOD PRESSURE: 158 MMHG | DIASTOLIC BLOOD PRESSURE: 101 MMHG

## 2018-10-21 VITALS — DIASTOLIC BLOOD PRESSURE: 101 MMHG | SYSTOLIC BLOOD PRESSURE: 158 MMHG

## 2018-10-21 LAB
BASOPHILS # BLD AUTO: 0.1 /CMM (ref 0–0.2)
BASOPHILS NFR BLD AUTO: 1.1 % (ref 0–2)
BUN SERPL-MCNC: 28 MG/DL (ref 7–18)
CALCIUM SERPL-MCNC: 7.3 MG/DL (ref 8.5–10.1)
CHLORIDE SERPL-SCNC: 106 MMOL/L (ref 98–107)
CO2 SERPL-SCNC: 27 MMOL/L (ref 21–32)
CREAT SERPL-MCNC: 2.2 MG/DL (ref 0.6–1.3)
EOSINOPHIL NFR BLD AUTO: 8.8 % (ref 0–6)
GLUCOSE SERPL-MCNC: 93 MG/DL (ref 74–106)
HCT VFR BLD AUTO: 37 % (ref 39–51)
HGB BLD-MCNC: 12.3 G/DL (ref 13.5–17.5)
LYMPHOCYTES NFR BLD AUTO: 0.9 /CMM (ref 0.8–4.8)
LYMPHOCYTES NFR BLD AUTO: 8.3 % (ref 20–44)
MAGNESIUM SERPL-MCNC: 1.8 MG/DL (ref 1.8–2.4)
MCHC RBC AUTO-ENTMCNC: 33 G/DL (ref 31–36)
MCV RBC AUTO: 98 FL (ref 80–96)
MONOCYTES NFR BLD AUTO: 1.6 /CMM (ref 0.1–1.3)
MONOCYTES NFR BLD AUTO: 15.1 % (ref 2–12)
NEUTROPHILS # BLD AUTO: 7 /CMM (ref 1.8–8.9)
NEUTROPHILS NFR BLD AUTO: 66.7 % (ref 43–81)
PHOSPHATE SERPL-MCNC: 3.5 MG/DL (ref 2.5–4.9)
PLATELET # BLD AUTO: 242 /CMM (ref 150–450)
POTASSIUM SERPL-SCNC: 3.5 MMOL/L (ref 3.5–5.1)
RBC # BLD AUTO: 3.8 MIL/UL (ref 4.5–6)
SODIUM SERPL-SCNC: 142 MMOL/L (ref 136–145)
WBC NRBC COR # BLD AUTO: 10.5 K/UL (ref 4.3–11)

## 2018-10-21 RX ADMIN — ALBUTEROL SULFATE SCH MG: 2.5 SOLUTION RESPIRATORY (INHALATION) at 07:33

## 2018-10-21 RX ADMIN — Medication SCH APPLIC: at 08:19

## 2018-10-21 RX ADMIN — DEXTROSE MONOHYDRATE SCH MLS/HR: 50 INJECTION, SOLUTION INTRAVENOUS at 08:14

## 2018-10-21 RX ADMIN — SILVER SULFADIAZINE SCH GM: 10 CREAM TOPICAL at 08:19

## 2018-10-21 RX ADMIN — ALBUTEROL SULFATE SCH MG: 2.5 SOLUTION RESPIRATORY (INHALATION) at 11:15

## 2018-10-21 RX ADMIN — Medication SCH EACH: at 08:18

## 2018-10-21 RX ADMIN — LORAZEPAM PRN MG: 2 INJECTION INTRAMUSCULAR; INTRAVENOUS at 13:48

## 2018-10-21 RX ADMIN — HYDROCODONE BITARTRATE AND ACETAMINOPHEN PRN EA: 10; 325 TABLET ORAL at 08:21

## 2018-10-21 RX ADMIN — Medication SCH MG: at 08:23

## 2018-10-21 RX ADMIN — FOLIC ACID SCH MG: 1 TABLET ORAL at 08:18

## 2018-10-21 RX ADMIN — Medication SCH MG: at 08:18

## 2018-10-21 RX ADMIN — CLOTRIMAZOLE SCH APPLIC: 1 CREAM TOPICAL at 08:19

## 2018-10-21 NOTE — NUR
DISCHARGED PATIENT IN STABLE CONDITION. IN NO APPARENT DISTRESS. IV LINE WAS LEFT IN PLACE 
DUE TO PATIENT RECEIVING IV ANTIBIOTICS AT [facility] AND [facility]. EXITCARE SIGNED AND 
PROVIDED TO THE PATIENT. WADSWORTH CATHETER REMOVED. ID BAND WAS REMOVED. ALL NEEDS WERE MET. 
PATIENT BELONGINGS CHECKED AND GIVEN TO THE PATIENT. [facility]

## 2018-10-21 NOTE — NUR
RN NOTES

PATIENT IS ASLEEP, NO DISTRESS, GIVEN NORCO 10/325, REPORTED GOOD RELIEF, WADSWORTH CATHETER 
DRAINING WELL, OUTPUT 1750, DRINKING ADEQUATE FLUIDS. DRESSING TO BLE ARE DRY AND INTACT, 
HEELS OFFLOADED. NO ADVERSE CHANGE OF CONDITION DURING SHIFT.